# Patient Record
Sex: MALE | Race: WHITE
[De-identification: names, ages, dates, MRNs, and addresses within clinical notes are randomized per-mention and may not be internally consistent; named-entity substitution may affect disease eponyms.]

---

## 2019-03-24 ENCOUNTER — HOSPITAL ENCOUNTER (EMERGENCY)
Dept: HOSPITAL 76 - ED | Age: 34
Discharge: HOME | End: 2019-03-24
Payer: COMMERCIAL

## 2019-03-24 VITALS — SYSTOLIC BLOOD PRESSURE: 140 MMHG | DIASTOLIC BLOOD PRESSURE: 90 MMHG

## 2019-03-24 DIAGNOSIS — R03.0: ICD-10-CM

## 2019-03-24 DIAGNOSIS — M54.5: Primary | ICD-10-CM

## 2019-03-24 PROCEDURE — 99283 EMERGENCY DEPT VISIT LOW MDM: CPT

## 2019-03-24 PROCEDURE — 96372 THER/PROPH/DIAG INJ SC/IM: CPT

## 2019-03-24 NOTE — ED PHYSICIAN DOCUMENTATION
PD HPI BACK INJURY





- Stated complaint


Stated Complaint: LOWER BACK PX





- History obtained from


History obtained from: Patient





- History of Present Illness


Location: Right (He felt a pull in the right low back today while lifting boxes 

as he is in the process of moving.  The pain does not radiate.  It is not 

associated with weakness, numbness, tingling, saddle anesthesia, or fevers. He 

tried ibuprofen without relief.)





Review of Systems


Constitutional: reports: Reviewed and negative


Cardiac: reports: Reviewed and negative


Respiratory: reports: Reviewed and negative





PD PAST MEDICAL HISTORY





- Past Medical History


Past Medical History: No





- Past Surgical History


Past Surgical History: Yes





- Present Medications


Home Medications: 


                                Ambulatory Orders











 Medication  Instructions  Recorded  Confirmed


 


Cyclobenzaprine [Flexeril] 10 mg PO TID PRN #20 tablet 03/24/19 


 


Hydrocodone/Acetaminophen 1 - 2 each PO Q6H PRN #10 tablet 03/24/19 





[Hydrocodon-Acetaminophen 5-325]   














- Allergies


Allergies/Adverse Reactions: 


                                    Allergies











Allergy/AdvReac Type Severity Reaction Status Date / Time


 


No Known Drug Allergies Allergy   Verified 03/24/19 21:43














- Social History


Does the pt smoke?: No


Smoking Status: Never smoker


Does the pt drink ETOH?: No


Does the pt have substance abuse?: No





- Immunizations


Immunizations are current?: Yes





- POLST


Patient has POLST: No





PD ED PE NORMAL





- Vitals


Vital signs reviewed: Yes





- General


General: Alert and oriented X 3, No acute distress





- Back


Back: No spinal TTP, Other (Mild muscular tenderness of the right low back and 

winces with motion.  Comfortable at rest.  Normal gait. The patient has equal 

and normal Achilles and patellar reflexes bilaterally.  Normal sensation in all 

areas of the legs.  Patient denies saddle anesthesia.  Normal strength in 

flexion-extension at the ankles, knees, and flexion of the hips.)





- Neuro


Neuro: Alert and oriented X 3, Normal speech





- Psych


Psych: Normal mood, Normal affect





Results





- Vitals


Vitals: 


                               Vital Signs - 24 hr











  03/24/19





  21:39


 


Temperature 36.8 C


 


Heart Rate 79


 


Respiratory 16





Rate 


 


Blood Pressure 154/95 H


 


O2 Saturation 98








                                     Oxygen











O2 Source                      Room air

















PD MEDICAL DECISION MAKING





- ED course


ED course: 





This patient has seemingly uncomplicated musculoskeletal back pain.  The patient

has no "red flags."  Specifically denies IV drug use, fevers, incontinence, 

saddle anesthesia.  Spinal epidural abscess was considered, given that the 

patient has no fever, is not diabetic, has no spinal tenderness, does not use IV

drugs, and has no bilateral neurologic symptoms, the diagnosis of spinal 

epidural abscess is considered exceedingly unlikely.





Departure





- Departure


Disposition: 01 Home, Self Care


Clinical Impression: 


Back pain


Qualifiers:


 Back pain location: low back pain Chronicity: acute Back pain laterality: right

Sciatica presence: without sciatica Qualified Code(s): M54.5 - Low back pain





Condition: Good


Record reviewed to determine appropriate education?: Yes


Instructions:  ED Low Back Pain Injury


Prescriptions: 


Cyclobenzaprine [Flexeril] 10 mg PO TID PRN #20 tablet


 PRN Reason: Spasms


Hydrocodone/Acetaminophen [Hydrocodon-Acetaminophen 5-325] 1 - 2 each PO Q6H PRN

#10 tablet


 PRN Reason: pain


Comments: 


Call your doctor to arrange a follow-up appointment, make the next available 

appointment.  In the interim, return anytime if worse or if new symptoms 

develop.





Do not drink or drive while taking narcotic pain medication.


Note that many narcotic pain relievers also contain Tylenol/acetaminophen.  

Please ensure that your total dose of acetaminophen from all sources does not 

exceed 3 g (3000 mg) per day.


You may get constipated while on this medication.  Take a stool softener such as

Colace twice a day while you are on it.  Also add an over-the-counter laxative 

such as senna or MiraLAX on any day that you do not have a bowel movement.


If you received a narcotic pain medication or sedative while in the emergency 

department, do not drive for the next 24 hours.





Your blood pressure was elevated today on check into the emergency department.  

This does not mean that you have hypertension, it is a common phenomenon to come

to the emergency department and have elevated blood pressure.  I recommend that 

you see your primary care physician within the week to have it rechecked when 

you are feeling better.

## 2019-12-20 ENCOUNTER — HOSPITAL ENCOUNTER (EMERGENCY)
Dept: HOSPITAL 76 - ED | Age: 34
Discharge: HOME | End: 2019-12-20
Payer: COMMERCIAL

## 2019-12-20 VITALS — DIASTOLIC BLOOD PRESSURE: 84 MMHG | SYSTOLIC BLOOD PRESSURE: 135 MMHG
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
## 2019-12-20 DIAGNOSIS — S61.216A: Primary | ICD-10-CM

## 2019-12-20 PROCEDURE — 99282 EMERGENCY DEPT VISIT SF MDM: CPT

## 2019-12-20 PROCEDURE — 99281 EMR DPT VST MAYX REQ PHY/QHP: CPT

## 2019-12-20 NOTE — ED PHYSICIAN DOCUMENTATION
PD HPI UPPER EXT INJURY





- Stated complaint


Stated Complaint: RT HAND LAC





- Chief complaint


Chief Complaint: Laceration





- History obtained from


History obtained from: Patient





- History of Present Illness


Location: Right (He cut his right pinky finger on a vegetable slicer yesterday 

at home.  He is up-to-date on tetanus.  He is mostly here because the Band-Aid 

is stuck on and he would like it removed.)





Review of Systems


Constitutional: reports: Reviewed and negative


Cardiac: reports: Reviewed and negative


Respiratory: reports: Dyspnea





PD PAST MEDICAL HISTORY





- Past Surgical History


Past Surgical History: Yes





- Present Medications


Home Medications: 


                                Ambulatory Orders











 Medication  Instructions  Recorded  Confirmed


 


Cyclobenzaprine [Flexeril] 10 mg PO TID PRN #20 tablet 03/24/19 


 


Hydrocodone/Acetaminophen 1 - 2 each PO Q6H PRN #10 tablet 03/24/19 





[Hydrocodon-Acetaminophen 5-325]   














- Allergies


Allergies/Adverse Reactions: 


                                    Allergies











Allergy/AdvReac Type Severity Reaction Status Date / Time


 


No Known Drug Allergies Allergy   Verified 12/20/19 17:06














- Social History


Does the pt smoke?: No


Smoking Status: Never smoker


Does the pt drink ETOH?: No


Does the pt have substance abuse?: No





- Immunizations


Immunizations are current?: Yes





- POLST


Patient has POLST: No





PD ED PE NORMAL





- Vitals


Vital signs reviewed: Yes





- General


General: Alert and oriented X 3, No acute distress





- Extremities


Extremities: Other (After gentle removal of the Band-Aid using saline, there is 

a skin avulsion of the side of the pinky less than 1 cm.  It was irrigated and 

dressed with Xeroform and tube gauze.)





- Neuro


Neuro: Alert and oriented X 3, Normal speech





Results





- Vitals


Vitals: 


                               Vital Signs - 24 hr











  12/20/19





  17:06


 


Temperature 37.1 C


 


Heart Rate 78


 


Respiratory 14





Rate 


 


Blood Pressure 135/84 H


 


O2 Saturation 97








                                     Oxygen











O2 Source                      Room air

















Departure





- Departure


Disposition: 01 Home, Self Care


Clinical Impression: 


 Skin avulsion





Condition: Good


Record reviewed to determine appropriate education?: Yes


Instructions:  ED Laceration Amputation Finger Tip Open Tx

## 2021-09-05 ENCOUNTER — HOSPITAL ENCOUNTER (EMERGENCY)
Dept: HOSPITAL 76 - ED | Age: 36
LOS: 1 days | Discharge: HOME | End: 2021-09-06
Payer: COMMERCIAL

## 2021-09-05 DIAGNOSIS — R58: Primary | ICD-10-CM

## 2021-09-05 PROCEDURE — 99282 EMERGENCY DEPT VISIT SF MDM: CPT

## 2021-09-05 PROCEDURE — 99284 EMERGENCY DEPT VISIT MOD MDM: CPT

## 2021-09-06 VITALS — DIASTOLIC BLOOD PRESSURE: 77 MMHG | SYSTOLIC BLOOD PRESSURE: 129 MMHG

## 2021-09-06 NOTE — ED PHYSICIAN DOCUMENTATION
PD HPI SKIN





- Stated complaint


Stated Complaint: RT THIGH BRUISING





- Chief complaint


Chief Complaint: Wound





- History obtained from


History obtained from: Patient





- Additional information


Additional information: 





35-year-old man presents with right inner thigh ecchymosis for the past week 

that his wife noticed with spreading downwards today.  He states that it is more

painful and he does not know how it happened.  Denies history of clots, hormone 

use, bedrest, recent surgery, injury to the thigh.  Denies numbness or weakness.





Review of Systems


Skin: reports: Other (Ecchymosis)


Musculoskeletal: denies: Extremity pain


Neurologic: denies: Focal weakness, Numbness





PD PAST MEDICAL HISTORY





- Past Surgical History


Past Surgical History: Yes





- Allergies


Allergies/Adverse Reactions: 


                                    Allergies











Allergy/AdvReac Type Severity Reaction Status Date / Time


 


No Known Drug Allergies Allergy   Verified 09/05/21 22:00














- Social History


Does the pt smoke?: No


Smoking Status: Never smoker


Does the pt drink ETOH?: No


Does the pt have substance abuse?: No





- Immunizations


Immunizations are current?: Yes





- POLST


Patient has POLST: No





PD ED PE NORMAL





- Vitals


Vital signs reviewed: Yes





- General


General: Alert and oriented X 3, No acute distress, Well developed/nourished





- HEENT


HEENT: Atraumatic, PERRL, EOMI





- Derm


Derm: Normal color, Warm and dry, Other (10 x 10 cm area of healing ecchymosis 

to distal right inner thigh)





- Extremities


Extremities: Other (2+ bilateral DP and PT pulses.  Normal sensation, capillary 

refill, strength)





- Neuro


Neuro: Alert and oriented X 3, No motor deficit, No sensory deficit





- Psych


Psych: Normal mood, Normal affect





Results





- Vitals


Vitals: 


                               Vital Signs - 24 hr











  09/05/21 09/06/21





  21:58 00:18


 


Temperature 36.4 C L 


 


Heart Rate 78 70


 


Respiratory 14 16





Rate  


 


Blood Pressure 142/89 H 129/77


 


O2 Saturation 97 97








                                     Oxygen











O2 Source                      Room air

















PD MEDICAL DECISION MAKING





- ED course


ED course: 





35-year-old man presented with ecchymosis of unknown origin for the last week.  

Ultrasound for DVT was negative.  Advised the patient to have follow-up 

ultrasound in 1 week.  Return precautions given.





Departure





- Departure


Disposition: 01 Home, Self Care


Clinical Impression: 


 Ecchymosis





Condition: Good


Instructions:  Bruises Contusions


Comments: 


You were seen in the emergency department for posterior right thigh.  An 

ultrasound was performed to evaluate for blood clots and did not show any clots.

 You need to have a follow-up ultrasound in 1 week in order to definitively rule

out clots.  Please return to the emergency department if you have any new or 

worsening symptoms or other concerns.  Follow-up with your primary doctor to 

order the ultrasound.


Discharge Date/Time: 09/06/21 00:19

## 2022-02-20 ENCOUNTER — HOSPITAL ENCOUNTER (EMERGENCY)
Dept: HOSPITAL 76 - ED | Age: 37
Discharge: HOME | End: 2022-02-20
Payer: COMMERCIAL

## 2022-02-20 VITALS — SYSTOLIC BLOOD PRESSURE: 129 MMHG | DIASTOLIC BLOOD PRESSURE: 82 MMHG
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
## 2022-02-20 DIAGNOSIS — S46.221A: Primary | ICD-10-CM
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
## 2022-02-20 PROCEDURE — 99282 EMERGENCY DEPT VISIT SF MDM: CPT

## 2022-02-20 NOTE — ED PHYSICIAN DOCUMENTATION
PD HPI UPPER EXT INJURY





- Stated complaint


Stated Complaint: POSSIBLE TEAR RIGHT ARM





- Chief complaint


Chief Complaint: Ext Problem





- History obtained from


History obtained from: Patient





- Additonal information


Additional information: 





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 

lifting a motorcycle today and felt a pop in the right bicep and medial elbow.  

He now has minimal pain at rest but with certain motions it is significant.  No 

other injuries.





Review of Systems


Constitutional: reports: Reviewed and negative


Ears: reports: Reviewed and negative


Throat: reports: Reviewed and negative


Cardiac: reports: Reviewed and negative


Respiratory: reports: Reviewed and negative





PD PAST MEDICAL HISTORY





- Past Surgical History


Past Surgical History: Yes





- Allergies


Allergies/Adverse Reactions: 


                                    Allergies











Allergy/AdvReac Type Severity Reaction Status Date / Time


 


No Known Drug Allergies Allergy   Verified 02/20/22 15:22














- Social History


Does the pt smoke?: No


Smoking Status: Never smoker


Does the pt drink ETOH?: No


Does the pt have substance abuse?: No





- Immunizations


Immunizations are current?: Yes





- POLST


Patient has POLST: No





PD ED PE NORMAL





- Vitals


Vital signs reviewed: Yes





- General


General: Alert and oriented X 3, No acute distress





- Extremities


Extremities: Other (There is no "Guy" deformity of the right bicep.  He is 

tender at the insertion of the bicep into the forearm as well as over the medial

epicondyle of the right elbow.  He has pain with resisted bicep contraction, but

he does have decent strength.)





- Neuro


Neuro: Alert and oriented X 3, Normal speech


Eye Opening: Spontaneous


Motor: Obeys Commands


Verbal: Oriented


GCS Score: 15





- Psych


Psych: Normal mood, Normal affect





Results





- Vitals


Vitals: 


                               Vital Signs - 24 hr











  02/20/22





  15:18


 


Temperature 36.5 C


 


Heart Rate 74


 


Respiratory 15





Rate 


 


Blood Pressure 129/82 H


 


O2 Saturation 99








                                     Oxygen











O2 Source                      Room air

















Procedures





- Splint (location)


  ** Right arm


Splint applied by: Tech


Type of splint: Fiberglass, Posterior


Other: Patient tolerated well, No complications, Neurovascular intact, Sling 

provided





PD MEDICAL DECISION MAKING





- ED course


ED course: 





36-year-old gentleman with right arm strain versus partial bicep tear.  Does not

appear like a complete bicep tear.  He is splinted and given a sling and advised

on follow-up.





Departure





- Departure


Disposition: 01 Home, Self Care


Clinical Impression: 


 Traumatic partial tear of right biceps tendon





Condition: Good


Record reviewed to determine appropriate education?: Yes


Comments: 


As discussed, hard to say if you have a partial bicep tear versus just a 

sprain/strain.  It does not look at all like a complete biceps tear because with

that you generally see a deformity which I do not.  Keep the fiberglass splint 

on and dry, [place] 

[place] for an appointment.  Return for new or worsening symptoms.


Forms:  Activity restrictions

## 2022-11-02 NOTE — ULTRASOUND REPORT
"PROCEDURE:  Duplex Ext Veins Right

 

INDICATIONS:  ecchymosis R inner thigh

 

TECHNIQUE:  

Real-time imaging, as well as color and pulse Doppler interrogation, were performed of the lower extr
emity deep veins from the inguinal ligament to the popliteal fossa.  

 

COMPARISON:  None.

 

FINDINGS:  The deep veins are normally compressible, and free of intraluminal thrombus.  Color and pu
lse Doppler demonstrate normal phasic intraluminal flow.  There is normal augmentation response to di
stal compression maneuver.  

 

IMPRESSION:  No evidence of DVT in visualized right lower extremity veins.

 

Reviewed by: Miguel Owens MD on 9/6/2021 12:11 AM PDT

Approved by: Miguel Owens MD on 9/6/2021 12:11 AM PDT

 

 

Station ID:  [id number]